# Patient Record
(demographics unavailable — no encounter records)

---

## 2024-12-23 NOTE — IMAGING
[de-identified] : LEFT SHOULDER Inspection: No swelling.  Palpation: Tenderness is noted at the bicipital groove, anterior and lateral.  Range of motion: There is pain with range of motion. , ER 55, @90ER 90, @90IR 30 Strength: There is pain, weakness, and discomfort with strength testing. Forward Flexion 3/5. Abduction 3/5. External Rotation 4/5 and Internal Rotation 5-/5  Neurological testings: motor and sensor intact distally. Ligament Stability and Special Tests:  There is positive arc of pain.  Shoulder apprehension: neg Shoulder relocation: neg Obriens test: pos Biceps Active test: neg Hunter Labral Shear: neg Impingement testing: pos Yadira testing: pos Whipple: pos Cross Body Adduction: neg

## 2024-12-23 NOTE — HISTORY OF PRESENT ILLNESS
[de-identified] : 12/23/2024: Pt here today for follow-up left shoulder. Pain and symptoms are better. Since last visit pt has had good relief from CSI. Today doing well w/ no c/o pain.  09/23/2024: Patient present today for follow-up left shoulder pain. Underwent MRI and is here to discuss the imaging results. Since last visit, pt has been feeling the same.  09/16/24: The patient is a 64 year old M, RHD who presents today complaining of left shoulder pain.  Date of Injury/Onset: 2+ years intermittent  Pain:    At Rest: varies/10 With Activity:   3-4/10 Mechanism of injury: No specific cause of injury/trauma. Quality of symptoms:  Aching Improves with: Naprosyn  Worse with:  Varies Prior treatment: [facility] urgent care August 2024 prescribed Naprosyn with significant improvement. Prior imaging: XR [facility] in 2022  Previous injury: [None] School/Sport/Position/Occupation: Retired  Additional Information: Seen by Deja Villagomez in 2022 for left shoulder pain, no further treatment.

## 2024-12-23 NOTE — DISCUSSION/SUMMARY
[de-identified] : Assessment: The patient is a 64 year old male with physical exam findings consistent with Left shoulder incomplete RTC, biceps tendonitis, impingement syndrome.   Patient and I discussed their symptoms. Discussed findings of today's exam and possible causes of patient's pain. Educated patient on their most probable diagnosis. Reviewed possible courses of treatment, and we collaboratively decided best course of treatment at this time will include:   1. Discussed surgical and non-surgical options such as CSI and PT. We also discussed the possibility of a cortisone injection in order to help decrease inflammation and pain and they wished to proceed with this course. 2. Discussed r/b/a to SX as this injury has been occurring for a while. 3. Prescribed anti-inflammatories to help with pain.  4. Initiate HEP, exercises provided.     Follow up 3 months  Instructions: Dx / Natural History The patient was advised of the diagnosis.  The natural history of the pathology was explained in full to the patient in layman's terms.  Several different treatment options were discussed and explained in full to the patient including the risks and benefits of both surgical and non-surgical treatments.  All questions and concerns were answered.   RICE I explained to the patient that rest, ice, compression, and elevation would benefit them.  They may return to activity after follow-up or when they no longer have any pain.   NSAIDs - OTC Patient is to begin over the counter oral anti-inflammatory medications on an as needed basis, as long as there are no medical contraindications.  Patient is counseled on possible GI and blood pressure side effects.   Pain Guide Activities The patient was advised to let pain guide the gradual advancement of activities.   Icing The patient was advised to apply ice (wrapped in a towel or protective covering) to the area daily (20 minutes at a time, 2-4X/day).   All of the patient's questions were answered to His satisfaction. Diagnoses and potential treatments were reviewed. He agreed with the plan and would like to move forward with it.

## 2025-05-19 NOTE — HISTORY OF PRESENT ILLNESS
[6] : 6 [0] : 0 [Localized] : localized [Intermittent] : intermittent [de-identified] : 64 year old male with LEFT thumb morning locking.  This has been present for about one month.  NO recent trauma [] : no [FreeTextEntry1] : left thumb [FreeTextEntry3] : ~ 2-4 weeks  [FreeTextEntry5] : NKI, patient reports clicking and locking in the morning, possible Trigger finger  [FreeTextEntry6] : clicking, locking  [FreeTextEntry9] : has not tried anything  [de-identified] : in the morning  [de-identified] : none [de-identified] : not working

## 2025-05-19 NOTE — HISTORY OF PRESENT ILLNESS
[6] : 6 [0] : 0 [Localized] : localized [Intermittent] : intermittent [de-identified] : 64 year old male with LEFT thumb morning locking.  This has been present for about one month.  NO recent trauma [] : no [FreeTextEntry1] : left thumb [FreeTextEntry3] : ~ 2-4 weeks  [FreeTextEntry5] : NKI, patient reports clicking and locking in the morning, possible Trigger finger  [FreeTextEntry6] : clicking, locking  [FreeTextEntry9] : has not tried anything  [de-identified] : in the morning  [de-identified] : none [de-identified] : not working

## 2025-05-19 NOTE — DISCUSSION/SUMMARY
[de-identified] : Discussed the nature of the diagnosis and risk and benefits of different modalities of treatment. LT trigger thumb  Discussed conservative management as symptoms demand vs CSI. He will apply warm compresses & take OTC NSAID's for 1 week RTO 3 weeks

## 2025-05-19 NOTE — DISCUSSION/SUMMARY
[de-identified] : Discussed the nature of the diagnosis and risk and benefits of different modalities of treatment. LT trigger thumb  Discussed conservative management as symptoms demand vs CSI. He will apply warm compresses & take OTC NSAID's for 1 week RTO 3 weeks

## 2025-06-09 NOTE — DISCUSSION/SUMMARY
[de-identified] : Discussed the nature of the diagnosis and risk and benefits of different modalities of treatment. LT trigger thumb  Discussed conservative management as symptoms demand vs CSI. Pt would like a CSI. Done today and tolerated well. Should CSI fail to provide relief, pt will take OTC NSAID's and apply warm compresses. All questions answered.

## 2025-06-09 NOTE — HISTORY OF PRESENT ILLNESS
[6] : 6 [0] : 0 [Localized] : localized [Intermittent] : intermittent [de-identified] : 64 year old male followed for LT trigger thumb. Pt has been applying warm compresses & taking OTC NSAID's without change in his symptoms.  [] : no [FreeTextEntry1] : left thumb [FreeTextEntry3] : ~ 2-4 weeks  [FreeTextEntry6] : clicking, locking  [FreeTextEntry5] : No changes since last visit.  [FreeTextEntry9] : has not tried anything  [de-identified] : in the morning  [de-identified] : none [de-identified] : warm compress and NSAIDS [de-identified] : not working

## 2025-06-09 NOTE — PROCEDURE
[FreeTextEntry3] : Tendon sheath was performed of the left thumb trigger finger. The indication for this procedure was pain and inflammation. The site was prepped with alcohol, ethyl chloride sprayed topically, and sterile technique used. An injection of Lidocaine 0.5cc of 1%, Triamcinolone (Kenalog) 0.5cc of 10 mg was used. Patient tolerated procedure well.   The risks benefits, and alternatives have been discussed, and verbal consent was obtained.